# Patient Record
Sex: FEMALE | Race: WHITE | NOT HISPANIC OR LATINO | Employment: FULL TIME | URBAN - NONMETROPOLITAN AREA
[De-identification: names, ages, dates, MRNs, and addresses within clinical notes are randomized per-mention and may not be internally consistent; named-entity substitution may affect disease eponyms.]

---

## 2017-06-22 RX ORDER — CITALOPRAM 40 MG/1
40 TABLET ORAL DAILY
COMMUNITY
End: 2017-06-23

## 2017-06-23 ENCOUNTER — OFFICE VISIT (OUTPATIENT)
Dept: FAMILY MEDICINE CLINIC | Facility: CLINIC | Age: 52
End: 2017-06-23

## 2017-06-23 VITALS
WEIGHT: 129.8 LBS | BODY MASS INDEX: 20.86 KG/M2 | HEIGHT: 66 IN | OXYGEN SATURATION: 99 % | RESPIRATION RATE: 18 BRPM | HEART RATE: 86 BPM | SYSTOLIC BLOOD PRESSURE: 122 MMHG | DIASTOLIC BLOOD PRESSURE: 82 MMHG

## 2017-06-23 DIAGNOSIS — H57.11 ACUTE RIGHT EYE PAIN: Primary | ICD-10-CM

## 2017-06-23 PROCEDURE — 99212 OFFICE O/P EST SF 10 MIN: CPT | Performed by: NURSE PRACTITIONER

## 2017-06-23 NOTE — PROGRESS NOTES
"Subjective   Kaylyn Bedolla is a 52 y.o. female.     History of Present Illness     Ms. Bedolla comes in today with complaints of right eye pain.  She reports approximately a week ago her eye became extremely red with purulent drainage, this lasted for 2 days and cleared up.  She reports that yesterday she awoke with severe right eye pain and copious clear drainage.  She had associated nasal drainage as well.  She did note a sore in the tip of the nare at the same time.  She reports she has some blurred vision with the eyes painful with movement, eye pain is described as \"deep\".  She does wear glasses however she tells me she does not have an eye doctor.  I have not seen Mrs. Bedolla is 2014, I saw her one time for depression.  The following portions of the patient's history were reviewed and updated as appropriate: allergies, current medications, past family history, past medical history, past social history, past surgical history and problem list.    Review of Systems   Constitutional: Negative.    HENT: Positive for rhinorrhea (right nare).    Eyes: Positive for photophobia and visual disturbance. Negative for pain and itching. Eye discharge: intermittent clear drainage right eye. Eye redness: intermittent redness right eye.        Right eye pain   Respiratory: Negative.    Cardiovascular: Negative.    Neurological: Negative.        Objective   Physical Exam   Constitutional: She is oriented to person, place, and time. She appears well-developed and well-nourished. No distress.   HENT:   Head: Normocephalic and atraumatic.   Eyes: No scleral icterus.   She finds it hard to do EOMs due to discomfort in the right eye.  Pupils are reactive and equal, I do not see much conjunctiva injection and there is no purulent drainage at this time.  She has no facial rash that would be an obvious zoster.  She does have a sore at the tip of the right nare   Neck: Neck supple.   Cardiovascular: Normal rate, regular rhythm, normal heart " sounds and intact distal pulses.    Pulmonary/Chest: Effort normal and breath sounds normal.   Lymphadenopathy:     She has no cervical adenopathy.   Neurological: She is alert and oriented to person, place, and time.   Vitals reviewed.      Assessment/Plan   aKylyn was seen today for eye drainage.    Diagnoses and all orders for this visit:    Acute right eye pain      My exam here is limited in the office due to equipment.  I do not see any obvious conjunctivitis.  There are no skin lesions indicating an obvious zoster infection however she does have a sore on the inside of her right nose.  I would like her to see an eye doctor, we did call and get her in with Dr. Gamez today.